# Patient Record
Sex: FEMALE | Race: WHITE | URBAN - METROPOLITAN AREA
[De-identification: names, ages, dates, MRNs, and addresses within clinical notes are randomized per-mention and may not be internally consistent; named-entity substitution may affect disease eponyms.]

---

## 2023-01-02 ENCOUNTER — OFFICE VISIT (OUTPATIENT)
Dept: FAMILY MEDICINE CLINIC | Facility: CLINIC | Age: 33
End: 2023-01-02
Payer: COMMERCIAL

## 2023-01-02 VITALS
HEIGHT: 67 IN | OXYGEN SATURATION: 99 % | HEART RATE: 118 BPM | SYSTOLIC BLOOD PRESSURE: 142 MMHG | DIASTOLIC BLOOD PRESSURE: 82 MMHG | TEMPERATURE: 97 F | WEIGHT: 130 LBS | RESPIRATION RATE: 16 BRPM | BODY MASS INDEX: 20.4 KG/M2

## 2023-01-02 DIAGNOSIS — Z71.1 PHYSICALLY WELL BUT WORRIED: Primary | ICD-10-CM

## 2023-01-02 PROCEDURE — 3077F SYST BP >= 140 MM HG: CPT

## 2023-01-02 PROCEDURE — 99202 OFFICE O/P NEW SF 15 MIN: CPT

## 2023-01-02 PROCEDURE — 3008F BODY MASS INDEX DOCD: CPT

## 2023-01-02 PROCEDURE — 3079F DIAST BP 80-89 MM HG: CPT

## 2023-01-02 RX ORDER — NORETHINDRONE ACETATE AND ETHINYL ESTRADIOL 1MG-20(21)
KIT ORAL
COMMUNITY
Start: 2022-12-13

## 2024-07-01 LAB
AMB EXT CHLAMYDIA, NUCLEIC ACID AMP: NOT DETECTED
AMB EXT GONOCOCCUS, NUCLEIC ACID AMP: NOT DETECTED

## 2024-07-17 LAB
AMB EXT TREPONEMAL ANTIBODIES: NONREACTIVE
ANTIBODY SCREEN OB: NEGATIVE
HEPATITIS B SURFACE ANTIGEN OB: NEGATIVE
HIV RESULT OB: NEGATIVE
RH FACTOR OB: POSITIVE

## 2024-11-13 LAB
AMB EXT TREPONEMAL ANTIBODIES: NONREACTIVE
HIV RESULT OB: NEGATIVE

## 2025-01-07 LAB — STREP GP B CULT OB: NEGATIVE

## 2025-02-07 ENCOUNTER — ANESTHESIA EVENT (OUTPATIENT)
Dept: OBGYN UNIT | Facility: HOSPITAL | Age: 35
End: 2025-02-07
Payer: COMMERCIAL

## 2025-02-07 ENCOUNTER — ANESTHESIA (OUTPATIENT)
Dept: OBGYN UNIT | Facility: HOSPITAL | Age: 35
End: 2025-02-07
Payer: COMMERCIAL

## 2025-02-07 ENCOUNTER — HOSPITAL ENCOUNTER (INPATIENT)
Facility: HOSPITAL | Age: 35
LOS: 2 days | Discharge: HOME | End: 2025-02-09
Attending: STUDENT IN AN ORGANIZED HEALTH CARE EDUCATION/TRAINING PROGRAM | Admitting: STUDENT IN AN ORGANIZED HEALTH CARE EDUCATION/TRAINING PROGRAM
Payer: COMMERCIAL

## 2025-02-07 PROBLEM — Z37.9 NORMAL LABOR (HCC): Status: ACTIVE | Noted: 2025-02-07

## 2025-02-07 LAB
ANTIBODY SCREEN: NEGATIVE
BASOPHILS # BLD AUTO: 0.03 X10(3) UL (ref 0–0.2)
BASOPHILS NFR BLD AUTO: 0.3 %
DEPRECATED RDW RBC AUTO: 41.2 FL (ref 35.1–46.3)
EOSINOPHIL # BLD AUTO: 0 X10(3) UL (ref 0–0.7)
EOSINOPHIL NFR BLD AUTO: 0 %
ERYTHROCYTE [DISTWIDTH] IN BLOOD BY AUTOMATED COUNT: 13.1 % (ref 11–15)
HCT VFR BLD AUTO: 41.1 %
HGB BLD-MCNC: 13.8 G/DL
IMM GRANULOCYTES # BLD AUTO: 0.09 X10(3) UL (ref 0–1)
IMM GRANULOCYTES NFR BLD: 0.8 %
LYMPHOCYTES # BLD AUTO: 0.28 X10(3) UL (ref 1–4)
LYMPHOCYTES NFR BLD AUTO: 2.5 %
MCH RBC QN AUTO: 29.2 PG (ref 26–34)
MCHC RBC AUTO-ENTMCNC: 33.6 G/DL (ref 31–37)
MCV RBC AUTO: 86.9 FL
MONOCYTES # BLD AUTO: 0.31 X10(3) UL (ref 0.1–1)
MONOCYTES NFR BLD AUTO: 2.8 %
NEUTROPHILS # BLD AUTO: 10.49 X10 (3) UL (ref 1.5–7.7)
NEUTROPHILS # BLD AUTO: 10.49 X10(3) UL (ref 1.5–7.7)
NEUTROPHILS NFR BLD AUTO: 93.6 %
PLATELET # BLD AUTO: 243 10(3)UL (ref 150–450)
RBC # BLD AUTO: 4.73 X10(6)UL
RH BLOOD TYPE: POSITIVE
RH BLOOD TYPE: POSITIVE
T PALLIDUM AB SER QL IA: NONREACTIVE
WBC # BLD AUTO: 11.2 X10(3) UL (ref 4–11)

## 2025-02-07 PROCEDURE — 0KQM0ZZ REPAIR PERINEUM MUSCLE, OPEN APPROACH: ICD-10-PCS | Performed by: OBSTETRICS & GYNECOLOGY

## 2025-02-07 PROCEDURE — 85025 COMPLETE CBC W/AUTO DIFF WBC: CPT | Performed by: STUDENT IN AN ORGANIZED HEALTH CARE EDUCATION/TRAINING PROGRAM

## 2025-02-07 PROCEDURE — 86850 RBC ANTIBODY SCREEN: CPT | Performed by: STUDENT IN AN ORGANIZED HEALTH CARE EDUCATION/TRAINING PROGRAM

## 2025-02-07 PROCEDURE — 86900 BLOOD TYPING SEROLOGIC ABO: CPT | Performed by: STUDENT IN AN ORGANIZED HEALTH CARE EDUCATION/TRAINING PROGRAM

## 2025-02-07 PROCEDURE — 99204 OFFICE O/P NEW MOD 45 MIN: CPT

## 2025-02-07 PROCEDURE — 86901 BLOOD TYPING SEROLOGIC RH(D): CPT | Performed by: STUDENT IN AN ORGANIZED HEALTH CARE EDUCATION/TRAINING PROGRAM

## 2025-02-07 PROCEDURE — 86780 TREPONEMA PALLIDUM: CPT | Performed by: STUDENT IN AN ORGANIZED HEALTH CARE EDUCATION/TRAINING PROGRAM

## 2025-02-07 RX ORDER — LIDOCAINE HYDROCHLORIDE AND EPINEPHRINE 15; 5 MG/ML; UG/ML
INJECTION, SOLUTION EPIDURAL
Status: COMPLETED | OUTPATIENT
Start: 2025-02-07 | End: 2025-02-07

## 2025-02-07 RX ORDER — CHOLECALCIFEROL (VITAMIN D3) 25 MCG
1 TABLET,CHEWABLE ORAL DAILY
Status: DISCONTINUED | OUTPATIENT
Start: 2025-02-07 | End: 2025-02-09

## 2025-02-07 RX ORDER — ACETAMINOPHEN 500 MG
1000 TABLET ORAL EVERY 6 HOURS PRN
Status: DISCONTINUED | OUTPATIENT
Start: 2025-02-07 | End: 2025-02-07 | Stop reason: HOSPADM

## 2025-02-07 RX ORDER — AMMONIA 15 % (W/V)
0.3 AMPUL (EA) INHALATION AS NEEDED
Status: DISCONTINUED | OUTPATIENT
Start: 2025-02-07 | End: 2025-02-09

## 2025-02-07 RX ORDER — IBUPROFEN 600 MG/1
600 TABLET, FILM COATED ORAL EVERY 6 HOURS
Status: DISCONTINUED | OUTPATIENT
Start: 2025-02-07 | End: 2025-02-09

## 2025-02-07 RX ORDER — ACETAMINOPHEN 500 MG
1000 TABLET ORAL EVERY 6 HOURS PRN
Status: DISCONTINUED | OUTPATIENT
Start: 2025-02-07 | End: 2025-02-09

## 2025-02-07 RX ORDER — CALCIUM CARBONATE 500 MG/1
1000 TABLET, CHEWABLE ORAL EVERY 4 HOURS PRN
Status: DISCONTINUED | OUTPATIENT
Start: 2025-02-07 | End: 2025-02-07 | Stop reason: HOSPADM

## 2025-02-07 RX ORDER — SODIUM CHLORIDE, SODIUM LACTATE, POTASSIUM CHLORIDE, CALCIUM CHLORIDE 600; 310; 30; 20 MG/100ML; MG/100ML; MG/100ML; MG/100ML
INJECTION, SOLUTION INTRAVENOUS CONTINUOUS
Status: DISCONTINUED | OUTPATIENT
Start: 2025-02-07 | End: 2025-02-07 | Stop reason: HOSPADM

## 2025-02-07 RX ORDER — IBUPROFEN 600 MG/1
600 TABLET, FILM COATED ORAL ONCE AS NEEDED
Status: COMPLETED | OUTPATIENT
Start: 2025-02-07 | End: 2025-02-07

## 2025-02-07 RX ORDER — ACETAMINOPHEN 500 MG
500 TABLET ORAL EVERY 6 HOURS PRN
Status: DISCONTINUED | OUTPATIENT
Start: 2025-02-07 | End: 2025-02-09

## 2025-02-07 RX ORDER — SIMETHICONE 80 MG
80 TABLET,CHEWABLE ORAL 3 TIMES DAILY PRN
Status: DISCONTINUED | OUTPATIENT
Start: 2025-02-07 | End: 2025-02-09

## 2025-02-07 RX ORDER — CHOLECALCIFEROL (VITAMIN D3) 25 MCG
1 TABLET,CHEWABLE ORAL DAILY
COMMUNITY

## 2025-02-07 RX ORDER — ACETAMINOPHEN 500 MG
500 TABLET ORAL EVERY 6 HOURS PRN
Status: DISCONTINUED | OUTPATIENT
Start: 2025-02-07 | End: 2025-02-07 | Stop reason: HOSPADM

## 2025-02-07 RX ORDER — TERBUTALINE SULFATE 1 MG/ML
0.25 INJECTION SUBCUTANEOUS AS NEEDED
Status: COMPLETED | OUTPATIENT
Start: 2025-02-07 | End: 2025-02-07

## 2025-02-07 RX ORDER — ONDANSETRON 2 MG/ML
4 INJECTION INTRAMUSCULAR; INTRAVENOUS EVERY 6 HOURS PRN
Status: DISCONTINUED | OUTPATIENT
Start: 2025-02-07 | End: 2025-02-07 | Stop reason: HOSPADM

## 2025-02-07 RX ORDER — LIDOCAINE HYDROCHLORIDE 10 MG/ML
INJECTION, SOLUTION INFILTRATION; PERINEURAL
Status: COMPLETED | OUTPATIENT
Start: 2025-02-07 | End: 2025-02-07

## 2025-02-07 RX ORDER — BISACODYL 10 MG
10 SUPPOSITORY, RECTAL RECTAL ONCE AS NEEDED
Status: DISCONTINUED | OUTPATIENT
Start: 2025-02-07 | End: 2025-02-09

## 2025-02-07 RX ORDER — NALBUPHINE HYDROCHLORIDE 10 MG/ML
2.5 INJECTION INTRAMUSCULAR; INTRAVENOUS; SUBCUTANEOUS
Status: DISCONTINUED | OUTPATIENT
Start: 2025-02-07 | End: 2025-02-07

## 2025-02-07 RX ORDER — DOCUSATE SODIUM 100 MG/1
100 CAPSULE, LIQUID FILLED ORAL
Status: DISCONTINUED | OUTPATIENT
Start: 2025-02-07 | End: 2025-02-09

## 2025-02-07 RX ORDER — BUPIVACAINE HYDROCHLORIDE 2.5 MG/ML
20 INJECTION, SOLUTION EPIDURAL; INFILTRATION; INTRACAUDAL ONCE
Status: DISCONTINUED | OUTPATIENT
Start: 2025-02-07 | End: 2025-02-07 | Stop reason: HOSPADM

## 2025-02-07 RX ORDER — CITRIC ACID/SODIUM CITRATE 334-500MG
30 SOLUTION, ORAL ORAL AS NEEDED
Status: DISCONTINUED | OUTPATIENT
Start: 2025-02-07 | End: 2025-02-07 | Stop reason: HOSPADM

## 2025-02-07 RX ORDER — DEXTROSE, SODIUM CHLORIDE, SODIUM LACTATE, POTASSIUM CHLORIDE, AND CALCIUM CHLORIDE 5; .6; .31; .03; .02 G/100ML; G/100ML; G/100ML; G/100ML; G/100ML
INJECTION, SOLUTION INTRAVENOUS AS NEEDED
Status: DISCONTINUED | OUTPATIENT
Start: 2025-02-07 | End: 2025-02-07 | Stop reason: HOSPADM

## 2025-02-07 RX ORDER — BUPIVACAINE HCL/0.9 % NACL/PF 0.25 %
5 PLASTIC BAG, INJECTION (ML) EPIDURAL AS NEEDED
Status: DISCONTINUED | OUTPATIENT
Start: 2025-02-07 | End: 2025-02-07

## 2025-02-07 RX ORDER — LIDOCAINE HYDROCHLORIDE 10 MG/ML
30 INJECTION, SOLUTION EPIDURAL; INFILTRATION; INTRACAUDAL; PERINEURAL ONCE
Status: COMPLETED | OUTPATIENT
Start: 2025-02-07 | End: 2025-02-07

## 2025-02-07 RX ADMIN — LIDOCAINE HYDROCHLORIDE AND EPINEPHRINE 5 ML: 15; 5 INJECTION, SOLUTION EPIDURAL at 03:55:00

## 2025-02-07 RX ADMIN — LIDOCAINE HYDROCHLORIDE 5 ML: 10 INJECTION, SOLUTION INFILTRATION; PERINEURAL at 03:55:00

## 2025-02-07 NOTE — ANESTHESIA POSTPROCEDURE EVALUATION
Patient: Monie Booth    Procedure Summary       Date: 02/07/25 Room / Location:     Anesthesia Start: 0355 Anesthesia Stop: 0859    Procedure: LABOR ANALGESIA Diagnosis:     Scheduled Providers:  Anesthesiologist: Trini Astorga MD    Anesthesia Type: epidural ASA Status: 2            Anesthesia Type: epidural    Vitals Value Taken Time   /74 02/07/25 0945   Temp 98.5 °F (36.9 °C) 02/07/25 0945   Pulse 116 02/07/25 0945   Resp 16 02/07/25 0945   SpO2 100 % 02/07/25 0915       [facility] AN Post Evaluation:   Patient Evaluated in PACU  Patient Participation: complete - patient participated  Level of Consciousness: awake  Pain Management: adequate  Airway Patency:patent  Dental exam unchanged from preop  Yes    Cardiovascular Status: acceptable  Respiratory Status: acceptable  Postoperative Hydration acceptable      TRINI ASTORGA MD  2/7/2025 10:17 AM

## 2025-02-07 NOTE — PROGRESS NOTES
[facility]  part of [facility]    Labor Progress Note    Monie Booth Patient Status:  Inpatient    1990 MRN [id number]   Location [facility] Attending Montserrat Donato DO   Hosp Day # 0 PCP None Pcp     Subjective:   Interval History:   Feeling pressure    Objective:   Vital signs in last 24 hours:  Temp:  [98.2 °F (36.8 °C)-98.5 °F (36.9 °C)] 98.5 °F (36.9 °C)  Pulse:  [] 125  Resp:  [16] 16  BP: ()/(44-89) 123/77  SpO2:  [96 %-100 %] 100 %    Input/Output:    Intake/Output Summary (Last 24 hours) at 2025 0831  Last data filed at 2025 0800  Gross per 24 hour   Intake --   Output 600 ml   Net -600 ml       Fetal Surveillance:  Fetal heart tones: accelerations  Uterine contractions: adequate    Sterile vaginal exam:  Dilation: 10 cm dilation   Effacement: 100%   Station: +1   Position: Cephalic  Presentation: vertex    Results:   Lab Results   Component Value Date    TREPONEMALAB Nonreactive 2025    RAPIDHIVSCRN Negative 2024    ABO B 2025    RH Positive 2025    WBC 11.2 (H) 2025    HGB 13.8 2025    HCT 41.1 2025    .0 2025       No results found for: \"DDIMER\", \"ESRML\", \"ESRPF\", \"CRP\", \"BNP\", \"MG\", \"PHOS\", \"TROP\", \"TROPHS\", \"CK\", \"CKMB\", \"TRAM\", \"RPR\", \"B12\", \"ETOH\", \"COLORUR\", \"CLARITY\", \"SPECGRAVITY\", \"PROUR\", \"GLUUR\", \"KETUR\", \"BILUR\", \"BLOODURINE\", \"NITRITE\", \"UROBILINOGEN\", \"LEUUR\", \"UASA\", \"POCGLU\", \"BLDCUL\", \"BLDSMR\"    Assessment & Plan:     Normal labor (HCC)  Complete.  Will start pushing.    Fetal tracing reassuring with good variability.     Plan discussed with patient who verbalizes understanding and agreement.    Ermelinda Elkins MD  2025

## 2025-02-07 NOTE — L&D DELIVERY NOTE
Aris Booth [[id number]]      Labor Events     labor?: No   steroids?: None  Antibiotics received during labor?: No  Rupture date/time: 2025 0800     Rupture type: SROM  Fluid color: Meconium  Labor type: Spontaneous Onset of Labor  Augmentation: None  Intrapartum & labor complications: Variable decelerations, Meconium       Labor Length    1st stage: 14h 12m  2nd stage: 0h 40m  3rd stage: 0h 06m       Labor Event Times    Labor onset date/time: 2025 1800  Dilation complete date/time: 2025  Start pushing date/time: 202515        Presentation    Presentation: Vertex  Position: Occiput Anterior       Operative Delivery    Operative Vaginal Delivery: No                Shoulder Dystocia    Shoulder Dystocia: No       Anesthesia    Method: Epidural               Delivery      Head delivery date/time: 2025 08:52:40   Delivery date/time:  25 08:52:51   Delivery type: Normal spontaneous vaginal delivery    Details:     Delivery location: delivery room  Delivery Room Temperature: 73       Delivery Providers    Delivering Clinician: Ermelinda Elkins MD   Delivery personnel:  Provider Role   Deisy Ochoa, RN Baby Nurse   Leticia Block, RN Delivery Nurse   Kristine Bhatia, RN Baby Nurse             Cord    Vessels: 3 Vessels  Complications: Nuchal, Body cord  # of loops: 2  Timed cord clamping: Yes  Time in sec: 300  Cord blood disposition: to lab  Gases sent?: No       Resuscitation    Method: None       [place] Measurements      Weight: 3180 g 7 lb 0.2 oz Length: 53.3 cm     Head circum.: 35.5 cm              Placenta    Date/time: 2025 0859  Removal: Spontaneous  Appearance: Intact  Disposition: Discarded       Apgars    Living status: Living   Apgar Scoring Key:    0 1 2    Skin color Blue or pale Acrocyanotic Completely pink    Heart rate Absent <100 bpm >100 bpm    Reflex irritability No response Grimace Cry or active withdrawal    Muscle  tone Limp Some flexion Active motion    Respiratory effort Absent Weak cry; hypoventilation Good, crying              1 Minute:  5 Minute:  10 Minute:  15 Minute:  20 Minute:      Skin color: 1  1       Heart rate: 2  2       Reflex irritablity: 2  2       Muscle tone: 2  2       Respiratory effort: 2  2       Total: 9  9          Apgars assigned by: MARYCRUZ CLAIRE RN   disposition: with mother       Skin to Skin    Skin to skin initiated date/time: 2025 0853  Skin to skin with: Mother       Vaginal Count    Initial count RN: Catherine Hanson RN  Initial count Tech: Candi Alvarado   Sharps    Initial counts 10   0    Final counts 10   2    Final count RN: Leticia Block RN  Final count MD: Ermelinda Elkins MD       Lacerations    Episiotomy: None  Perineal lacerations: 2nd Repaired?: Yes     Vaginal laceration?: No      Cervical laceration?: No    Clitoral laceration?: No    Quantitative blood loss (mL): 81            [facility]  part of [facility]    Vaginal Delivery Note    Monie Booth Patient Status:  Inpatient    1990 MRN [id number]   Location [facility] Attending Montserrat Donato DO   Hosp Day # 0 PCP None Pcp     Delivery     Infant  Date of Delivery: 2025   Time of Delivery: 8:52 AM  Delivery Type: Normal spontaneous vaginal delivery    Infant Sex/Birthweight: male 7 lb 0.2 oz (3.18 kg)    Presentation Vertex [1]  Position   Occiput [1] Anterior [1]    Apgars:  1 minute: 9               5 minutes: 9                        10 minutes:      Placenta  Date/Time of Delivery: 2025  8:59 AM   Delivery: spontaneous  Placenta to Pathology: no  Cord Gases Submitted: no  Cord Blood Collection: no  Cord Tissue Collection: no  Cord Complications: single nuchal and body  Sponge and Needle Counts:  Verified yes    Maternal Anesthesia: epidural   Episiotomy/Laceration Repair  Episiotomy: none  Laceration: perineal 2 degree  Location:  midline  Suture Size/Type: 2-0 Chromic and 3-0 Chromic  Anesthesia: 1% lidocaine    Delivery Complications  none    Neonatologist Present: no  Delivery Comment: Patient complete and pushing in LDR with epidural.  Delivered ZE head over intact perineum.  Nuchal cord x 1.  Body delivered and placed on maternal abdomen.  Cord clamped x2 and cut by father.   of intact 3v cord and placenta.  Laceration repaired in routine manner.  Cervix and rectum intact.        Intake/Output   Quantitative Blood Loss (mL) 81      Ermelinda Elkins MD   2025  11:03 AM

## 2025-02-07 NOTE — PROGRESS NOTES
Patient up to bathroom with assist x 2.  Unable to void at this time. Patient transferred to mother/baby room 364 per wheelchair in stable condition with baby and personal belongings.  Accompanied by significant other and staff.  Report given to Liane mother/baby RN.

## 2025-02-07 NOTE — PROGRESS NOTES
Pt is a 34 year old female admitted to TR1/TR1-A.     Chief Complaint   Patient presents with    R/o Labor     Patient states she started vomiting and having diarrhea at 6pm which then led her to start having contractions      Pt is  Unknown intra-uterine pregnancy.  History obtained, consents signed. Oriented to room, staff, and plan of care.

## 2025-02-07 NOTE — ANESTHESIA PREPROCEDURE EVALUATION
Anesthesia PreOp Note    HPI:     Monie Booth is a 34 year old female who presents for preoperative consultation requested by: * No surgeons listed *    Date of Surgery: 2/7/2025    * No procedures listed *  Indication: * No pre-op diagnosis entered *    Relevant Problems   No relevant active problems       NPO:                         History Review:  Patient Active Problem List    Diagnosis Date Noted    Normal labor (HCC) 02/07/2025       Past Medical History:    Anemia       History reviewed. No pertinent surgical history.    Prescriptions Prior to Admission[1]  Current Medications and Prescriptions Ordered in Epic[2]    Allergies[3]    No family history on file.  Social History     Socioeconomic History    Marital status:    Tobacco Use    Smoking status: Never    Smokeless tobacco: Never       Available pre-op labs reviewed.  Lab Results   Component Value Date    WBC 11.2 (H) 02/07/2025    RBC 4.73 02/07/2025    HGB 13.8 02/07/2025    HCT 41.1 02/07/2025    MCV 86.9 02/07/2025    MCH 29.2 02/07/2025    MCHC 33.6 02/07/2025    RDW 13.1 02/07/2025    .0 02/07/2025             Vital Signs:  Body mass index is 25.84 kg/m².   height is 1.702 m (5' 7\") and weight is 74.8 kg (165 lb). Her oral temperature is 98.4 °F (36.9 °C). Her blood pressure is 119/89 and her pulse is 108. Her respiration is 16.   Vitals:    02/07/25 0226   BP: 119/89   Pulse: 108   Resp: 16   Temp: 98.4 °F (36.9 °C)   TempSrc: Oral   Weight: 74.8 kg (165 lb)   Height: 1.702 m (5' 7\")        Anesthesia Evaluation     Patient summary reviewed and Nursing notes reviewed    Airway   Mallampati: II  TM distance: >3 FB  Neck ROM: full  Dental      Pulmonary - negative ROS and normal exam   Cardiovascular - negative ROS and normal exam  Exercise tolerance: good    NYHA Classification: I    Neuro/Psych - negative ROS     GI/Hepatic/Renal - negative ROS     Endo/Other - negative ROS   Abdominal  - normal exam     Other findings:  02/07/25 03:02  WBC: 11.2 (H)  Hemoglobin: 13.8  Hematocrit: 41.1  Platelet Count: 243.0      (H): Data is abnormally high            Anesthesia Plan:   ASA:  2  Plan:   Epidural  Informed Consent Plan and Risks Discussed With:  Patient  Discussed plan with:  Attending and surgeon      I have informed Monie Booth and/or legal guardian or family member of the nature of the anesthetic plan, benefits, risks including possible dental damage if relevant, major complications, and any alternative forms of anesthetic management.   All of the patient's questions were answered to the best of my ability. The patient desires the anesthetic management as planned.  I have informed this Monie Booth [relative] of the risks of neuraxial anesthesia including, but not limited to: failure,headache, backache, spinal, unilateral/patchy block, difficulty breathing, infection, bleeding, nerve damage, paralysis, death. The patient desires the proposed neuraxial anesthetic as planned.  MANJINDER SINGLETON MD  2/7/2025 3:53 AM  Present on Admission:  **None**           [1]   Medications Prior to Admission   Medication Sig Dispense Refill Last Dose/Taking    prenatal vitamin with DHA 27-0.8-228 MG Oral Cap Take 1 capsule by mouth daily.   2/6/2025    BLISOVI FE 1/20 1-20 MG-MCG Oral Tab    2/6/2025   [2]   Current Facility-Administered Medications Ordered in Epic   Medication Dose Route Frequency Provider Last Rate Last Admin    acetaminophen (Tylenol Extra Strength) tab 500 mg  500 mg Oral Q6H PRN Donato, Montserrat, DO        acetaminophen (Tylenol Extra Strength) tab 1,000 mg  1,000 mg Oral Q6H PRN Donato, Montserrat, DO        ibuprofen (Motrin) tab 600 mg  600 mg Oral Once PRN Donato, Montserrat, DO        ondansetron (Zofran) 4 MG/2ML injection 4 mg  4 mg Intravenous Q6H PRN Donato, Montserrat, DO   4 mg at 02/07/25 0316    oxyTOCIN in sodium chloride 0.9% (Pitocin) 30 Units/500mL infusion premix  62.5-900 shelia-units/min Intravenous Continuous Donato,  Montserrat, DO        sodium citrate-citric acid (Bicitra) 500-334 MG/5ML oral solution 30 mL  30 mL Oral PRN Montserrat Donato, DO        lidocaine PF (Xylocaine-MPF) 1% injection  30 mL Intradermal Once Montserrat Donato, DO        lactated ringers infusion   Intravenous Continuous Montserrat Donato, DO        dextrose in lactated ringers 5% infusion   Intravenous PRN Montserrat Donato, DO        lactated ringers IV bolus 500 mL  500 mL Intravenous PRN Montserrat Donato, DO        fentaNYL (Sublimaze) 50 mcg/mL injection 100 mcg  100 mcg Intravenous Once Montserrat Donato, DO        fentaNYL (Sublimaze) 50 mcg/mL injection 50 mcg  50 mcg Intravenous Q30 Min PRN Montserrat Donato DO        calcium carbonate (Tums) chewable tab 1,000 mg  1,000 mg Oral Q4H PRN Montserrat Donato, DO        fentaNYL-bupivacaine 2 mcg/mL-0.125% in sodium chloride 0.9% 100 mL EPIDURAL infusion premix   Epidural Continuous Kenia Madrigal MD        fentaNYL (Sublimaze) 50 mcg/mL injection 100 mcg  100 mcg Epidural Once Kenia Madrigal MD        bupivacaine PF (Marcaine) 0.25% injection  20 mL Epidural Once Kenia Madrigal MD        ePHEDrine (PF) 25 MG/5 ML injection 5 mg  5 mg Intravenous PRN Kenia Madrigal MD        nalbuphine (Nubain) 10 mg/mL injection 2.5 mg  2.5 mg Intravenous Q15 Min PRN Kenia Madrigal MD         No current Epic-ordered outpatient medications on file.   [3] No Known Allergies

## 2025-02-07 NOTE — PLAN OF CARE
Problem: POSTPARTUM  Goal: Long Term Goal:Experiences normal postpartum course  Description: INTERVENTIONS:  - Assess and monitor vital signs and lab values.  - Assess fundus and lochia.  - Provide ice/sitz baths for perineum discomfort.  - Monitor healing of incision/episiotomy/laceration, and assess for signs and symptoms of infection and hematoma.  - Assess bladder function and monitor for bladder distention.  - Provide/instruct/assist with pericare as needed.  - Provide VTE prophylaxis as needed.  - Monitor bowel function.  - Encourage ambulation and provide assistance as needed.  - Assess and monitor emotional status and provide social service/psych resources as needed.  - Utilize standard precautions and use personal protective equipment as indicated. Ensure aseptic care of all intravenous lines and invasive tubes/drains.  - Obtain immunization and exposure to communicable diseases history.  Outcome: Progressing  Goal: Optimize infant feeding at the breast  Description: INTERVENTIONS:  - Initiate breast feeding within first hour after birth.   - Monitor effectiveness of current breast feeding efforts.  - Assess support systems available to mother/family.  - Identify cultural beliefs/practices regarding lactation, letdown techniques, maternal food preferences.  - Assess mother's knowledge and previous experience with breast feeding.  - Provide information as needed about early infant feeding cues (e.g., rooting, lip smacking, sucking fingers/hand) versus late cue of crying.  - Discuss/demonstrate breast feeding aids (e.g., infant sling, nursing footstool/pillows, and breast pumps).  - Encourage mother/other family members to express feelings/concerns, and actively listen.  - Educate father/SO about benefits of breast feeding and how to manage common lactation challenges.  - Recommend avoidance of specific medications or substances incompatible with breast feeding.  - Assess and monitor for signs of nipple  pain/trauma.  - Instruct and provide assistance with proper latch.  - Review techniques for milk expression (breast pumping) and storage of breast milk. Provide pumping equipment/supplies, instructions and assistance, as needed.  - Encourage rooming-in and breast feeding on demand.  - Encourage skin-to-skin contact.  - Provide LC support as needed.  - Assess for and manage engorgement.  - Provide breast feeding education handouts and information on community breast feeding support.   Outcome: Progressing  Goal: Establishment of adequate milk supply with medication/procedure interruptions  Description: INTERVENTIONS:  - Review techniques for milk expression (breast pumping).   - Provide pumping equipment/supplies, instructions, and assistance until it is safe to breastfeed infant.  Outcome: Progressing  Goal: Experiences normal breast weaning course  Description: INTERVENTIONS:  - Assess for and manage engorgement.  - Instruct on breast care.  - Provide comfort measures.  Outcome: Progressing  Goal: Appropriate maternal -  bonding  Description: INTERVENTIONS:  - Assess caregiver- interactions.  - Assess caregiver's emotional status and coping mechanisms.  - Encourage caregiver to participate in  daily care.  - Assess support systems available to mother/family.  - Provide /case management support as needed.  Outcome: Progressing     Problem: GENITOURINARY - ADULT  Goal: Absence of urinary retention  Description: INTERVENTIONS:  - Assess patient’s ability to void and empty bladder  - Monitor intake/output and perform bladder scan as needed  - Follow urinary retention protocol/standard of care  - Consider collaborating with pharmacy to review patient's medication profile  - Implement strategies to promote bladder emptying  Outcome: Progressing     Problem: Patient/Family Goals  Goal: Patient/Family Long Term Goal  Description: Patient's Long Term Goal: Uncomplicated Delivery      Interventions:  - Assessment/Monitoring  - Induction/Augmentation per protocol and Provider order  - C/S per protocol and Provider order   - Education  - Intervention per protocol and Provider order with education   - Involve patient in POC  - See additional Care Plan goals for specific interventions     Outcome: Progressing  Goal: Patient/Family Short Term Goal  Description: Patient's Short Term Goal: Comfort and Pain Control     Interventions:   - Non Pharmacological pain intervention   - IV/IM and Epidural pain medication per Provider order and patient request  - Education  - Involve Patient in POC   - See additional Care Plan goals for specific interventions       Outcome: Progressing     Problem: Patient Centered Care  Goal: Patient preferences are identified and integrated in the patient's plan of care  Description: Interventions:  - What would you like us to know as we care for you? Patient is having a surprise baby, first time mom, and would like an epidural for pain management  - Provide timely, complete, and accurate information to patient/family  - Incorporate patient and family knowledge, values, beliefs, and cultural backgrounds into the planning and delivery of care  - Encourage patient/family to participate in care and decision-making at the level they choose  - Honor patient and family perspectives and choices  Outcome: Progressing

## 2025-02-07 NOTE — H&P
[facility]  part of [facility]    History & Physical    Monie Booth Patient Status:  Inpatient    1990 MRN [id number]   Location [facility] Attending Montserrat Donato DO   Hosp Day # 0 PCP None Pcp     Date of Admission:  2025      HPI:   Monie Booth is a 34 year old  female, current EGA of 40w3d with an estimated date of delivery of: 2025, by Patient Reported who presents due to contractions.\  C/o onset of vomiting and diarrhea earlier this afternoon. No fever. Started noting contractions later into the evening. Feel they were spaced at time and then back to back to back. Also noted some small bleeding as well. Advised triage at this point.     Being admitted for labor management.      Her current obstetrical history is uncomplicated.    Patient Active Problem List   Diagnosis    Normal labor (HCC)         History   Obstetric History:   OB History    Para Term  AB Living   1             SAB IAB Ectopic Multiple Live Births                  # Outcome Date GA Lbr Oswaldo/2nd Weight Sex Type Anes PTL Lv   1 Current                Past Medical History:   Past Medical History:    Anemia       Past Surgerical History: History reviewed. No pertinent surgical history.    Social History:   Social History     Tobacco Use    Smoking status: Never    Smokeless tobacco: Never   Substance Use Topics    Alcohol use: Not on file        Allergies/Medications:   Allergies:   Allergies[1]    Medications:  Prescriptions Prior to Admission[2]      Review of Systems:   As documented in HPI      Physical Exam:   Temp:  [98.4 °F (36.9 °C)] 98.4 °F (36.9 °C)  Pulse:  [108] 108  Resp:  [16] 16  BP: (119)/(89) 119/89    Neurologic: Alert and oriented  Psychiatric: Cooperative  Constitutional: alert and cooperative  Pulm: non labored breathing  CV: regular rate  Abdomen: soft,  nontender, gravid    SVE:  /-1 per RN in triage     FHT assessment:  145bpm/mod/+accels/portion of recurrent late decelerations when moved to [facility]       Results:     Recent Results (from the past 24 hours)   CBC With Differential With Platelet    Collection Time: 25  3:02 AM   Result Value Ref Range    WBC 11.2 (H) 4.0 - 11.0 x10(3) uL    RBC 4.73 3.80 - 5.30 x10(6)uL    HGB 13.8 12.0 - 16.0 g/dL    HCT 41.1 35.0 - 48.0 %    MCV 86.9 80.0 - 100.0 fL    MCH 29.2 26.0 - 34.0 pg    MCHC 33.6 31.0 - 37.0 g/dL    RDW-SD 41.2 35.1 - 46.3 fL    RDW 13.1 11.0 - 15.0 %    .0 150.0 - 450.0 10(3)uL    Neutrophil Absolute Prelim 10.49 (H) 1.50 - 7.70 x10 (3) uL    Neutrophil Absolute 10.49 (H) 1.50 - 7.70 x10(3) uL    Lymphocyte Absolute 0.28 (L) 1.00 - 4.00 x10(3) uL    Monocyte Absolute 0.31 0.10 - 1.00 x10(3) uL    Eosinophil Absolute 0.00 0.00 - 0.70 x10(3) uL    Basophil Absolute 0.03 0.00 - 0.20 x10(3) uL    Immature Granulocyte Absolute 0.09 0.00 - 1.00 x10(3) uL    Neutrophil % 93.6 %    Lymphocyte % 2.5 %    Monocyte % 2.8 %    Eosinophil % 0.0 %    Basophil % 0.3 %    Immature Granulocyte % 0.8 %       Prenatal Labs  Blood Type:  Rh positive  HIV- non reactive  Syphilis- non reactive  Hepatitis B: non reactive  Hepatitis C: non reactive  Rubella immune  GBS negative.       Imaging:  Reviewed      Assessment/Plan:     Monie Booth is a 34 year old  female, current EGA of 40w3d who presents for admission due to labor    Expectantly manage labor.     Called to bedside at approximately 0300 for recurrent late decels once transferred to [facility]. Patient repositioned several times and IV fluid bolus started. Gallo q1-2 min. Terbutaline given at approx 0315 and contractions spaced thereafter with improvement to category 1 FHRT.   Reasonable to proceed with Epidural      Montserrat Donato, DO  2025  3:37 AM         [1] No Known Allergies  [2]   Medications Prior to Admission   Medication Sig Dispense Refill Last Dose/Taking    prenatal vitamin with DHA 27-0.8-228 MG Oral  Cap Take 1 capsule by mouth daily.   2/6/2025    BLISOVI FE 1/20 1-20 MG-MCG Oral Tab    2/6/2025

## 2025-02-07 NOTE — ANESTHESIA PROCEDURE NOTES
Labor Analgesia    Date/Time: 2/7/2025 3:55 AM    Performed by: Kenia Madrigal MD  Authorized by: Kenia Madrigal MD      General Information and Staff    Start Time:  2/7/2025 3:55 AM  End Time:  2/7/2025 4:12 AM  Anesthesiologist:  Kenia Madrigal MD  Performed by:  Anesthesiologist  Patient Location:  OB  Site Identification: surface landmarks    Reason for Block: labor epidural    Preanesthetic Checklist: patient identified, IV checked, site marked, risks and benefits discussed, monitors and equipment checked, pre-op evaluation, timeout performed, IV bolus, anesthesia consent and sterile technique used      Procedure Details    Patient Position:  Sitting  Prep: ChloraPrep and patient draped    Monitoring:  Heart rate, cardiac monitor and continuous pulse ox  Approach:  Midline    Epidural Needle    Injection Technique:  NOMAN saline  Needle Type:  Tuohy  Needle Gauge:  18 G  Needle Length:  3.5 in  Needle Insertion Depth:  4  Location:  L2-3    Spinal Needle      Catheter    Catheter Type:  Multi-orifice  Catheter Size:  20 G  Catheter at Skin Depth:  11  Test Dose:  Negative    Assessment    Sensory Level:  T8    Additional Comments

## 2025-02-07 NOTE — PLAN OF CARE
Problem: BIRTH - VAGINAL/ SECTION  Goal: Fetal and maternal status remain reassuring during the birth process  Description: INTERVENTIONS:  - Monitor vital signs  - Monitor fetal heart rate  - Monitor uterine activity  - Monitor labor progression (vaginal delivery)  - DVT prophylaxis (C/S delivery)  - Surgical antibiotic prophylaxis (C/S delivery)  Outcome: Progressing     Problem: PAIN - ADULT  Goal: Verbalizes/displays adequate comfort level or patient's stated pain goal  Description: INTERVENTIONS:  - Encourage pt to monitor pain and request assistance  - Assess pain using appropriate pain scale  - Administer analgesics based on type and severity of pain and evaluate response  - Implement non-pharmacological measures as appropriate and evaluate response  - Consider cultural and social influences on pain and pain management  - Manage/alleviate anxiety  - Utilize distraction and/or relaxation techniques  - Monitor for opioid side effects  - Notify MD/LIP if interventions unsuccessful or patient reports new pain  - Anticipate increased pain with activity and pre-medicate as appropriate  Outcome: Progressing     Problem: ANXIETY  Goal: Will report anxiety at manageable levels  Description: INTERVENTIONS:  - Administer medication as ordered  - Teach and rehearse alternative coping skills  - Provide emotional support with 1:1 interaction with staff  Outcome: Progressing     Problem: Patient Centered Care  Goal: Patient preferences are identified and integrated in the patient's plan of care  Description: Interventions:  - What would you like us to know as we care for you? Patient is having a surprise baby, first time mom, and would like an epidural for pain management  - Provide timely, complete, and accurate information to patient/family  - Incorporate patient and family knowledge, values, beliefs, and cultural backgrounds into the planning and delivery of care  - Encourage patient/family to participate in  care and decision-making at the level they choose  - Honor patient and family perspectives and choices  Outcome: Progressing     Problem: Patient/Family Goals  Goal: Patient/Family Long Term Goal  Description: Patient's Long Term Goal: Uncomplicated Delivery     Interventions:  - Assessment/Monitoring  - Induction/Augmentation per protocol and Provider order  - C/S per protocol and Provider order   - Education  - Intervention per protocol and Provider order with education   - Involve patient in POC  - See additional Care Plan goals for specific interventions     Outcome: Progressing  Goal: Patient/Family Short Term Goal  Description: Patient's Short Term Goal: Comfort and Pain Control     Interventions:   - Non Pharmacological pain intervention   - IV/IM and Epidural pain medication per Provider order and patient request  - Education  - Involve Patient in POC   - See additional Care Plan goals for specific interventions       Outcome: Progressing

## 2025-02-08 LAB
BASOPHILS # BLD AUTO: 0.03 X10(3) UL (ref 0–0.2)
BASOPHILS NFR BLD AUTO: 0.2 %
DEPRECATED RDW RBC AUTO: 43.2 FL (ref 35.1–46.3)
EOSINOPHIL # BLD AUTO: 0.07 X10(3) UL (ref 0–0.7)
EOSINOPHIL NFR BLD AUTO: 0.6 %
ERYTHROCYTE [DISTWIDTH] IN BLOOD BY AUTOMATED COUNT: 13.3 % (ref 11–15)
HCT VFR BLD AUTO: 31.6 %
HGB BLD-MCNC: 10.9 G/DL
IMM GRANULOCYTES # BLD AUTO: 0.16 X10(3) UL (ref 0–1)
IMM GRANULOCYTES NFR BLD: 1.3 %
LYMPHOCYTES # BLD AUTO: 1.73 X10(3) UL (ref 1–4)
LYMPHOCYTES NFR BLD AUTO: 13.6 %
MCH RBC QN AUTO: 30.6 PG (ref 26–34)
MCHC RBC AUTO-ENTMCNC: 34.5 G/DL (ref 31–37)
MCV RBC AUTO: 88.8 FL
MONOCYTES # BLD AUTO: 1.08 X10(3) UL (ref 0.1–1)
MONOCYTES NFR BLD AUTO: 8.5 %
NEUTROPHILS # BLD AUTO: 9.61 X10 (3) UL (ref 1.5–7.7)
NEUTROPHILS # BLD AUTO: 9.61 X10(3) UL (ref 1.5–7.7)
NEUTROPHILS NFR BLD AUTO: 75.8 %
PLATELET # BLD AUTO: 200 10(3)UL (ref 150–450)
RBC # BLD AUTO: 3.56 X10(6)UL
WBC # BLD AUTO: 12.7 X10(3) UL (ref 4–11)

## 2025-02-08 PROCEDURE — 85025 COMPLETE CBC W/AUTO DIFF WBC: CPT | Performed by: OBSTETRICS & GYNECOLOGY

## 2025-02-08 NOTE — LACTATION NOTE
This note was copied from a baby's chart.  LACTATION NOTE - INFANT    Evaluation Type  Evaluation Type: Inpatient    Problems & Assessment  Problems Diagnosed or Identified: Sleepy  Infant Assessment: Hunger cues present  Muscle tone: Appropriate for GA    Feeding Assessment  Summary Current Feeding: Breastfeeding exclusively  Breastfeeding Assessment: Assisted with breastfeeding w/mother's permission;Pulling on nipple;Calm and ready to breastfeed;Deep latch achieved and observed  Breastfeeding Positions: laid back;left breast  Latch: Repeated attempts, hold nipple in mouth, stimulate to suck  Audible Sucks/Swallows: A few with stimulation  Type of Nipple: Everted (after stimulation)  Comfort (Breast/Nipple): Filling, red/small blisters/bruises, mild/mod discomfort  Hold (Positioning): No assist from staff, mother able to position/hold infant  LATCH Score: 7                        Baby had circ earlier today and been sleepy/ fussy. Assisted with a breast feeding session, good sucks and swallows. Would like to come in for a outpatient appt.

## 2025-02-08 NOTE — PLAN OF CARE
Problem: Patient Centered Care  Goal: Patient preferences are identified and integrated in the patient's plan of care  Description: Interventions:  - What would you like us to know as we care for you? Patient is having a surprise baby, first time mom, and would like an epidural for pain management  - Provide timely, complete, and accurate information to patient/family  - Incorporate patient and family knowledge, values, beliefs, and cultural backgrounds into the planning and delivery of care  - Encourage patient/family to participate in care and decision-making at the level they choose  - Honor patient and family perspectives and choices  Outcome: Progressing     Problem: GENITOURINARY - ADULT  Goal: Absence of urinary retention  Description: INTERVENTIONS:  - Assess patient’s ability to void and empty bladder  - Monitor intake/output and perform bladder scan as needed  - Follow urinary retention protocol/standard of care  - Consider collaborating with pharmacy to review patient's medication profile  - Implement strategies to promote bladder emptying  Outcome: Progressing     Problem: POSTPARTUM  Goal: Optimize infant feeding at the breast  Description: INTERVENTIONS:  - Initiate breast feeding within first hour after birth.   - Monitor effectiveness of current breast feeding efforts.  - Assess support systems available to mother/family.  - Identify cultural beliefs/practices regarding lactation, letdown techniques, maternal food preferences.  - Assess mother's knowledge and previous experience with breast feeding.  - Provide information as needed about early infant feeding cues (e.g., rooting, lip smacking, sucking fingers/hand) versus late cue of crying.  - Discuss/demonstrate breast feeding aids (e.g., infant sling, nursing footstool/pillows, and breast pumps).  - Encourage mother/other family members to express feelings/concerns, and actively listen.  - Educate father/SO about benefits of breast feeding and  how to manage common lactation challenges.  - Recommend avoidance of specific medications or substances incompatible with breast feeding.  - Assess and monitor for signs of nipple pain/trauma.  - Instruct and provide assistance with proper latch.  - Review techniques for milk expression (breast pumping) and storage of breast milk. Provide pumping equipment/supplies, instructions and assistance, as needed.  - Encourage rooming-in and breast feeding on demand.  - Encourage skin-to-skin contact.  - Provide LC support as needed.  - Assess for and manage engorgement.  - Provide breast feeding education handouts and information on community breast feeding support.   Outcome: Progressing  Goal: Establishment of adequate milk supply with medication/procedure interruptions  Description: INTERVENTIONS:  - Review techniques for milk expression (breast pumping).   - Provide pumping equipment/supplies, instructions, and assistance until it is safe to breastfeed infant.  Outcome: Progressing  Goal: Appropriate maternal -  bonding  Description: INTERVENTIONS:  - Assess caregiver- interactions.  - Assess caregiver's emotional status and coping mechanisms.  - Encourage caregiver to participate in  daily care.  - Assess support systems available to mother/family.  - Provide /case management support as needed.  Outcome: Progressing

## 2025-02-08 NOTE — PROGRESS NOTES
[facility]  part of [facility]    OB/Gyne Postpartum Progress Note      Monie Booth Patient Status:  Inpatient    1990 MRN [id number]   Location [facility] 3SE Attending Montserrat Donato DO   Hosp Day # 1 PCP None Pcp       Subjective      Patient feeling well.   Pain well controlled. Lochia appropriate.   Tolerating diet. Denies N/V  Ambulating. Spontaneously voiding. Passing flatus   Breastfeeding    Objective   Vital signs in last 24 hours:  Temp:  [97.9 °F (36.6 °C)-98.7 °F (37.1 °C)] 97.9 °F (36.6 °C)  Pulse:  [64-88] 64  Resp:  [15-18] 16  BP: (103-122)/(62-74) 103/65    Input/Output:    Intake/Output Summary (Last 24 hours) at 2025 1305  Last data filed at 2025 1605  Gross per 24 hour   Intake --   Output 800 ml   Net -800 ml         Constitutional: comfortable  Pulm: non labored breathing  CV: regular rate  Abdomen: soft, nontender, nondistended  Uterus: fundus firm at umbilicus,   Extremities: No calf tenderness      Results:   Labs / Path / Radiology:    Recent Results (from the past 24 hours)   CBC With Differential With Platelet    Collection Time: 25  5:36 AM   Result Value Ref Range    WBC 12.7 (H) 4.0 - 11.0 x10(3) uL    RBC 3.56 (L) 3.80 - 5.30 x10(6)uL    HGB 10.9 (L) 12.0 - 16.0 g/dL    HCT 31.6 (L) 35.0 - 48.0 %    MCV 88.8 80.0 - 100.0 fL    MCH 30.6 26.0 - 34.0 pg    MCHC 34.5 31.0 - 37.0 g/dL    RDW-SD 43.2 35.1 - 46.3 fL    RDW 13.3 11.0 - 15.0 %    .0 150.0 - 450.0 10(3)uL    Neutrophil Absolute Prelim 9.61 (H) 1.50 - 7.70 x10 (3) uL    Neutrophil Absolute 9.61 (H) 1.50 - 7.70 x10(3) uL    Lymphocyte Absolute 1.73 1.00 - 4.00 x10(3) uL    Monocyte Absolute 1.08 (H) 0.10 - 1.00 x10(3) uL    Eosinophil Absolute 0.07 0.00 - 0.70 x10(3) uL    Basophil Absolute 0.03 0.00 - 0.20 x10(3) uL    Immature Granulocyte Absolute 0.16 0.00 - 1.00 x10(3) uL    Neutrophil % 75.8 %    Lymphocyte % 13.6 %    Monocyte % 8.5 %    Eosinophil % 0.6 %     Basophil % 0.2 %    Immature Granulocyte % 1.3 %       Recent Labs   Lab 25  0302 25  0536   RBC 4.73 3.56*   HGB 13.8 10.9*   HCT 41.1 31.6*   MCV 86.9 88.8   MCH 29.2 30.6   MCHC 33.6 34.5   RDW 13.1 13.3   NEPRELIM 10.49* 9.61*   WBC 11.2* 12.7*   .0 200.0             Assessment/Plan   34 year oldyo  , s/p , postpartum day 1.      Postpartum:  -General diet, ambulating  -Voiding freely   -Meeting milestones  Continue postpartum care     2. Baby boy - discussed risks of circumcision including bleeding, infection, need for circumcision revision      Nanda Gay MD  2025  1:05 PM

## 2025-02-08 NOTE — LACTATION NOTE
LACTATION NOTE - MOTHER      Evaluation Type: Inpatient    Problems identified  Problems identified: Knowledge deficit         Breastfeeding goal  Breastfeeding goal: To maintain breast milk feeding per patient goal    Maternal Assessment  Bilateral Breasts: Soft;Symmetrical  Bilateral Nipples: WNL  Prior breastfeeding experience (comment below): Primip  Breastfeeding Assistance: Breastfeeding assistance provided with permission;Hand expression provided with permission    Pain assessment  Location/Comment: denies                   Assisted with a breast feeding session. Baby placed skin to skin, and able to do breast crawl and latch in laid back. Very sleepy and only  a few sucks, patient states baby is latching well.  Encouraged STS. Patient return demonstrated hand expression and spoon feeding. Discussed normal NB behavior. Encouraged to call LC if assistance with breastfeeding is needed.

## 2025-02-08 NOTE — LACTATION NOTE
This note was copied from a baby's chart.  LACTATION NOTE - INFANT    Evaluation Type  Evaluation Type: Inpatient    Problems & Assessment  Problems Diagnosed or Identified: Sleepy  Infant Assessment: Hunger cues present  Muscle tone: Appropriate for GA    Feeding Assessment  Summary Current Feeding: Breastfeeding exclusively  Breastfeeding Assessment: Assisted with breastfeeding w/mother's permission;Pulling on nipple;Calm and ready to breastfeed;Deep latch achieved and observed  Breastfeeding Positions: laid back;left breast  Latch: Repeated attempts, hold nipple in mouth, stimulate to suck  Audible Sucks/Swallows: Spontaneous and intermittent (24 hours old)  Type of Nipple: Everted (after stimulation)  Comfort (Breast/Nipple): Soft/non-tender  Hold (Positioning): Full assist, teach one side, mother does other, staff holds  LATCH Score: 8

## 2025-02-08 NOTE — DISCHARGE INSTRUCTIONS
For Outpatient Lactation Appointment- We are located in [facility] office next to [facility] on the first floor, try not to fed baby for 2 hours before appt, and bring your breast pump with you, any milk or formula you are supplementing with, and the bottles you are using.       Call your healthcare provider right away:   -fever over 100.4  -heavy vaginal bleeding   -severe or worsening pain unrelieved by medication   -severe anxiety or depression  - Dizziness and/or changes in vision  - severe headache,   - calf pain or swelling  - Chest pain or shortness of breath.     You are on pelvic rest for 6 weeks. This means nothing in vagina (no intercourse, tampons, etc.) and no baths or swimming pool.    No heavy lifting (no more than the baby) until cleared by OB.

## 2025-02-09 VITALS
TEMPERATURE: 98 F | OXYGEN SATURATION: 100 % | DIASTOLIC BLOOD PRESSURE: 63 MMHG | WEIGHT: 165 LBS | HEIGHT: 67 IN | SYSTOLIC BLOOD PRESSURE: 107 MMHG | HEART RATE: 73 BPM | RESPIRATION RATE: 16 BRPM | BODY MASS INDEX: 25.9 KG/M2

## 2025-02-09 PROBLEM — Z37.9 NORMAL LABOR (HCC): Status: RESOLVED | Noted: 2025-02-07 | Resolved: 2025-02-09

## 2025-02-09 RX ORDER — IBUPROFEN 200 MG
600 TABLET ORAL EVERY 6 HOURS PRN
Qty: 20 TABLET | Refills: 0 | Status: SHIPPED | COMMUNITY
Start: 2025-02-09

## 2025-02-09 RX ORDER — PSEUDOEPHEDRINE HCL 30 MG
100 TABLET ORAL 2 TIMES DAILY PRN
Qty: 20 CAPSULE | Refills: 0 | Status: SHIPPED | COMMUNITY
Start: 2025-02-09

## 2025-02-09 RX ORDER — ACETAMINOPHEN 500 MG
500 TABLET ORAL EVERY 6 HOURS PRN
Qty: 20 TABLET | Refills: 0 | Status: SHIPPED | COMMUNITY
Start: 2025-02-09

## 2025-02-09 NOTE — DISCHARGE SUMMARY
[facility]  part of [facility]    Obstetrical Discharge Summary    Monie Booth Patient Status:  Inpatient    1990 MRN [id number]   Location [facility] 3SE Attending Montserrat Donato DO   Hosp Day # 2 PCP None Pcp     Date of Admission: 2025     Date of Discharge: 2025    Admitting Diagnosis: PREGNANCY  Normal labor (HCC)    Discharge Diagnosis: .Active Problems:    * No active hospital problems. *      Disposition: home    Hospital Course:   Reason for Admission:  Monie Booth is a 34 year old  who was admitted with Estimated Date of Delivery: 25. She presented for labor.       Hospital Course: Pt underwent a spontaneous vaginal. Refer to delivery note/ operative report for details.  Pt was transferred to mother/ baby.  She underwent an uncomplicated postpartum course.  Pt is being discharged today.    Date of Delivery: 2025   Time of Delivery: 8:52 AM  Delivery Type: Normal spontaneous vaginal delivery    Live   Information for the patient's :  Aris Booth [[id number]]   male infant   Information for the patient's :  Aris Booth [[id number]]   7 lb 0.2 oz (3.18 kg)   Apgars:  1 minute: 9               5 minutes: 9                        10 minutes:        Postpartum Course: Her postpartum course was uncomplicated     Results:   Recent Results (from the past 2 weeks)   CBC With Differential With Platelet    Collection Time: 25  3:02 AM   Result Value Ref Range    WBC 11.2 (H) 4.0 - 11.0 x10(3) uL    RBC 4.73 3.80 - 5.30 x10(6)uL    HGB 13.8 12.0 - 16.0 g/dL    HCT 41.1 35.0 - 48.0 %    MCV 86.9 80.0 - 100.0 fL    MCH 29.2 26.0 - 34.0 pg    MCHC 33.6 31.0 - 37.0 g/dL    RDW-SD 41.2 35.1 - 46.3 fL    RDW 13.1 11.0 - 15.0 %    .0 150.0 - 450.0 10(3)uL    Neutrophil Absolute Prelim 10.49 (H) 1.50 - 7.70 x10 (3) uL    Neutrophil Absolute 10.49 (H) 1.50 - 7.70 x10(3) uL    Lymphocyte Absolute 0.28 (L) 1.00 - 4.00  x10(3) uL    Monocyte Absolute 0.31 0.10 - 1.00 x10(3) uL    Eosinophil Absolute 0.00 0.00 - 0.70 x10(3) uL    Basophil Absolute 0.03 0.00 - 0.20 x10(3) uL    Immature Granulocyte Absolute 0.09 0.00 - 1.00 x10(3) uL    Neutrophil % 93.6 %    Lymphocyte % 2.5 %    Monocyte % 2.8 %    Eosinophil % 0.0 %    Basophil % 0.3 %    Immature Granulocyte % 0.8 %   T Pallidum Screening Cascade    Collection Time: 02/07/25  3:02 AM   Result Value Ref Range    Treponemal Antibodies Nonreactive Nonreactive    ABORH (Blood Type)    Collection Time: 02/07/25  3:02 AM   Result Value Ref Range    ABO BLOOD TYPE B     RH BLOOD TYPE Positive    Antibody Screen    Collection Time: 02/07/25  3:02 AM   Result Value Ref Range    Antibody Screen Negative    ABORH Confirmation    Collection Time: 02/07/25  4:24 AM   Result Value Ref Range    ABO BLOOD TYPE B     RH BLOOD TYPE Positive    CBC With Differential With Platelet    Collection Time: 02/08/25  5:36 AM   Result Value Ref Range    WBC 12.7 (H) 4.0 - 11.0 x10(3) uL    RBC 3.56 (L) 3.80 - 5.30 x10(6)uL    HGB 10.9 (L) 12.0 - 16.0 g/dL    HCT 31.6 (L) 35.0 - 48.0 %    MCV 88.8 80.0 - 100.0 fL    MCH 30.6 26.0 - 34.0 pg    MCHC 34.5 31.0 - 37.0 g/dL    RDW-SD 43.2 35.1 - 46.3 fL    RDW 13.3 11.0 - 15.0 %    .0 150.0 - 450.0 10(3)uL    Neutrophil Absolute Prelim 9.61 (H) 1.50 - 7.70 x10 (3) uL    Neutrophil Absolute 9.61 (H) 1.50 - 7.70 x10(3) uL    Lymphocyte Absolute 1.73 1.00 - 4.00 x10(3) uL    Monocyte Absolute 1.08 (H) 0.10 - 1.00 x10(3) uL    Eosinophil Absolute 0.07 0.00 - 0.70 x10(3) uL    Basophil Absolute 0.03 0.00 - 0.20 x10(3) uL    Immature Granulocyte Absolute 0.16 0.00 - 1.00 x10(3) uL    Neutrophil % 75.8 %    Lymphocyte % 13.6 %    Monocyte % 8.5 %    Eosinophil % 0.6 %    Basophil % 0.2 %    Immature Granulocyte % 1.3 %     No results for input(s): \"ABORH\" in the last 72 hours.  Recent Labs     02/07/25  0302 02/08/25  0536   WBC 11.2* 12.7*   HGB 13.8 10.9*   HCT  41.1 31.6*     No results found.    Complications: none       Discharge Plan:   Discharge Condition: stable     Discharge Meds:   Current Discharge Medication List        New Orders    Details   acetaminophen 500 MG Oral Tab Take 1 tablet (500 mg total) by mouth every 6 (six) hours as needed.      docusate sodium 100 MG Oral Cap Take 100 mg by mouth 2 (two) times daily as needed for constipation.      ibuprofen 200 MG Oral Tab Take 3 tablets (600 mg total) by mouth every 6 (six) hours as needed for Pain.           Home Meds - Unchanged    Details   prenatal vitamin with DHA 27-0.8-228 MG Oral Cap Take 1 capsule by mouth daily.                   Discharge Activity: Pelvic rest until cleared  No heavy lifting >15 lbs  Abstain from sexual intercourse until cleared at 6 wks PP  No driving the first week and when on opiates  Activity as tolerated.    No exercise until cleared at 6 wks PP    Follow up:     Follow up in office: 6 weeks         Other Discharge Instructions:           For Outpatient Lactation Appointment- We are located in [facility] office next to [facility] on the first floor, try not to fed baby for 2 hours before appt, and bring your breast pump with you, any milk or formula you are supplementing with, and the bottles you are using.       Call your healthcare provider right away:   -fever over 100.4  -heavy vaginal bleeding   -severe or worsening pain unrelieved by medication   -severe anxiety or depression  - Dizziness and/or changes in vision  - severe headache,   - calf pain or swelling  - Chest pain or shortness of breath.     You are on pelvic rest for 6 weeks. This means nothing in vagina (no intercourse, tampons, etc.) and no baths or swimming pool.    No heavy lifting (no more than the baby) until cleared by OB.           Nanda Gay MD  2/9/2025

## 2025-02-09 NOTE — PROGRESS NOTES
[facility]  part of [facility]    OB/Gyne Postpartum Progress Note      Monie Booth Patient Status:  Inpatient    1990 MRN [id number]   Location [facility] 3SE Attending Montserrat Donato DO   Hosp Day # 2 PCP None Pcp       Subjective      Patient feeling well.   Pain well controlled. Lochia appropriate.   Tolerating diet. Denies N/V  Ambulating. Spontaneously voiding. Passing flatus   Breastfeeding    Objective   Vital signs in last 24 hours:  Temp:  [97 °F (36.1 °C)-98.1 °F (36.7 °C)] 98.1 °F (36.7 °C)  Pulse:  [73] 73  Resp:  [16-18] 16  BP: (107)/(63) 107/63    Input/Output:  No intake or output data in the 24 hours ending 25 1146      Constitutional: comfortable  Pulm: non labored breathing  CV: regular rate  Abdomen: soft, nontender, nondistended  Uterus: fundus firm at umbilicus,   Extremities: No calf tenderness      Results:   Labs / Path / Radiology:    No results found for this or any previous visit (from the past 24 hours).    Recent Labs   Lab 25  0302 25  0536   RBC 4.73 3.56*   HGB 13.8 10.9*   HCT 41.1 31.6*   MCV 86.9 88.8   MCH 29.2 30.6   MCHC 33.6 34.5   RDW 13.1 13.3   NEPRELIM 10.49* 9.61*   WBC 11.2* 12.7*   .0 200.0             Assessment/Plan   34 year oldyo  , s/p , postpartum day 2.      Postpartum:  -General diet, ambulating  -Voiding freely   -Meeting milestones    Discharge home today  Discussed restrictions, instructions, medications  F/U in office in 6 weeks       Nanda Gay MD  2025  11:46 AM

## 2025-02-09 NOTE — PLAN OF CARE
Problem: Patient Centered Care  Goal: Patient preferences are identified and integrated in the patient's plan of care  Description: Interventions:  - What would you like us to know as we care for you? Patient is having a surprise baby, first time mom, and would like an epidural for pain management  - Provide timely, complete, and accurate information to patient/family  - Incorporate patient and family knowledge, values, beliefs, and cultural backgrounds into the planning and delivery of care  - Encourage patient/family to participate in care and decision-making at the level they choose  - Honor patient and family perspectives and choices  Outcome: Adequate for Discharge     Problem: Patient/Family Goals  Goal: Patient/Family Long Term Goal  Description: Patient's Long Term Goal: Uncomplicated Delivery     Interventions:  - Assessment/Monitoring  - Induction/Augmentation per protocol and Provider order  - C/S per protocol and Provider order   - Education  - Intervention per protocol and Provider order with education   - Involve patient in POC  - See additional Care Plan goals for specific interventions     Outcome: Adequate for Discharge  Goal: Patient/Family Short Term Goal  Description: Patient's Short Term Goal: Comfort and Pain Control     Interventions:   - Non Pharmacological pain intervention   - IV/IM and Epidural pain medication per Provider order and patient request  - Education  - Involve Patient in POC   - See additional Care Plan goals for specific interventions       Outcome: Adequate for Discharge     Problem: GENITOURINARY - ADULT  Goal: Absence of urinary retention  Description: INTERVENTIONS:  - Assess patient’s ability to void and empty bladder  - Monitor intake/output and perform bladder scan as needed  - Follow urinary retention protocol/standard of care  - Consider collaborating with pharmacy to review patient's medication profile  - Implement strategies to promote bladder emptying  Outcome:  Adequate for Discharge     Problem: POSTPARTUM  Goal: Long Term Goal:Experiences normal postpartum course  Description: INTERVENTIONS:  - Assess and monitor vital signs and lab values.  - Assess fundus and lochia.  - Provide ice/sitz baths for perineum discomfort.  - Monitor healing of incision/episiotomy/laceration, and assess for signs and symptoms of infection and hematoma.  - Assess bladder function and monitor for bladder distention.  - Provide/instruct/assist with pericare as needed.  - Provide VTE prophylaxis as needed.  - Monitor bowel function.  - Encourage ambulation and provide assistance as needed.  - Assess and monitor emotional status and provide social service/psych resources as needed.  - Utilize standard precautions and use personal protective equipment as indicated. Ensure aseptic care of all intravenous lines and invasive tubes/drains.  - Obtain immunization and exposure to communicable diseases history.  Outcome: Adequate for Discharge  Goal: Optimize infant feeding at the breast  Description: INTERVENTIONS:  - Initiate breast feeding within first hour after birth.   - Monitor effectiveness of current breast feeding efforts.  - Assess support systems available to mother/family.  - Identify cultural beliefs/practices regarding lactation, letdown techniques, maternal food preferences.  - Assess mother's knowledge and previous experience with breast feeding.  - Provide information as needed about early infant feeding cues (e.g., rooting, lip smacking, sucking fingers/hand) versus late cue of crying.  - Discuss/demonstrate breast feeding aids (e.g., infant sling, nursing footstool/pillows, and breast pumps).  - Encourage mother/other family members to express feelings/concerns, and actively listen.  - Educate father/SO about benefits of breast feeding and how to manage common lactation challenges.  - Recommend avoidance of specific medications or substances incompatible with breast feeding.  - Assess  and monitor for signs of nipple pain/trauma.  - Instruct and provide assistance with proper latch.  - Review techniques for milk expression (breast pumping) and storage of breast milk. Provide pumping equipment/supplies, instructions and assistance, as needed.  - Encourage rooming-in and breast feeding on demand.  - Encourage skin-to-skin contact.  - Provide LC support as needed.  - Assess for and manage engorgement.  - Provide breast feeding education handouts and information on community breast feeding support.   Outcome: Adequate for Discharge  Goal: Establishment of adequate milk supply with medication/procedure interruptions  Description: INTERVENTIONS:  - Review techniques for milk expression (breast pumping).   - Provide pumping equipment/supplies, instructions, and assistance until it is safe to breastfeed infant.  Outcome: Adequate for Discharge  Goal: Experiences normal breast weaning course  Description: INTERVENTIONS:  - Assess for and manage engorgement.  - Instruct on breast care.  - Provide comfort measures.  Outcome: Adequate for Discharge  Goal: Appropriate maternal -  bonding  Description: INTERVENTIONS:  - Assess caregiver- interactions.  - Assess caregiver's emotional status and coping mechanisms.  - Encourage caregiver to participate in  daily care.  - Assess support systems available to mother/family.  - Provide /case management support as needed.  Outcome: Adequate for Discharge   Discharge education and paperwork complete.

## 2025-02-13 ENCOUNTER — NURSE ONLY (OUTPATIENT)
Dept: LACTATION | Facility: HOSPITAL | Age: 35
End: 2025-02-13
Attending: OBSTETRICS & GYNECOLOGY
Payer: COMMERCIAL

## 2025-02-13 PROCEDURE — 99213 OFFICE O/P EST LOW 20 MIN: CPT

## 2025-02-25 ENCOUNTER — NURSE ONLY (OUTPATIENT)
Dept: LACTATION | Facility: HOSPITAL | Age: 35
End: 2025-02-25
Attending: OBSTETRICS & GYNECOLOGY
Payer: COMMERCIAL

## 2025-02-25 PROCEDURE — 99212 OFFICE O/P EST SF 10 MIN: CPT

## 2025-03-08 ENCOUNTER — TELEPHONE (OUTPATIENT)
Dept: OBGYN UNIT | Facility: HOSPITAL | Age: 35
End: 2025-03-08

## 2025-03-12 ENCOUNTER — NURSE ONLY (OUTPATIENT)
Dept: LACTATION | Facility: HOSPITAL | Age: 35
End: 2025-03-12
Attending: OBSTETRICS & GYNECOLOGY
Payer: COMMERCIAL

## 2025-03-12 PROCEDURE — 99213 OFFICE O/P EST LOW 20 MIN: CPT

## 2025-04-23 ENCOUNTER — NURSE ONLY (OUTPATIENT)
Dept: LACTATION | Facility: HOSPITAL | Age: 35
End: 2025-04-23
Attending: OBSTETRICS & GYNECOLOGY
Payer: COMMERCIAL

## 2025-04-23 PROCEDURE — 99212 OFFICE O/P EST SF 10 MIN: CPT

## 2025-06-13 ENCOUNTER — NURSE ONLY (OUTPATIENT)
Dept: LACTATION | Facility: HOSPITAL | Age: 35
End: 2025-06-13
Attending: OBSTETRICS & GYNECOLOGY
Payer: COMMERCIAL

## 2025-06-13 PROCEDURE — 99212 OFFICE O/P EST SF 10 MIN: CPT

## (undated) NOTE — LETTER
[facility]  Administration of Anesthesia  I Monie Booth agree to be cared for by a physician anesthesiologist alone and/or with a nurse anesthetist, who is specially trained to monitor me and give me medicine to put me to sleep or keep me comfortable during my procedure    I understand that my anesthesiologist and/or anesthetist is not an employee or agent of [facility] or [employer]. He or she works for [facility]    As the patient asking for anesthesia services, I agree to:  Allow the anesthesiologist (anesthesia doctor) to give me medicine and do additional procedures as necessary. Some examples are: Starting or using an “IV” to give me medicine, fluids or blood during my procedure, and having a breathing tube placed to help me breathe when I’m asleep (intubation). In the event that my heart stops working properly, I understand that my anesthesiologist will make every effort to sustain my life, unless otherwise directed by [facility] Do Not Resuscitate documents.  Tell my anesthesia doctor before my procedure:  If I am pregnant.  The last time that I ate or drank.  iii. All of the medicines I take (including prescriptions, herbal supplements, and pills I can buy without a prescription (including street drugs/illegal medications). Failure to inform my anesthesiologist about these medicines may increase my risk of anesthetic complications.  iv.If I am allergic to anything or have had a reaction to anesthesia before.  I understand how the anesthesia medicine will help me (benefits).  I understand that with any type of anesthesia medicine there are risks:  The most common risks are: nausea, vomiting, sore throat, muscle soreness, damage to my eyes, mouth, or teeth (from breathing tube placement).  Rare risks include: remembering what happened during my procedure, allergic reactions to medications, injury to my airway, heart, lungs, vision, nerves, or  muscles and in extremely rare instances death.  My doctor has explained to me other choices available to me for my care (alternatives).  Pregnant Patients (“epidural”):  I understand that the risks of having an epidural (medicine given into my back to help control pain during labor), include itching, low blood pressure, difficulty urinating, headache or slowing of the baby’s heart. Very rare risks include infection, bleeding, seizure, irregular heart rhythms and nerve injury.  Regional Anesthesia (“spinal”, “epidural”, & “nerve blocks”):  I understand that rare but potential complications include headache, bleeding, infection, seizure, irregular heart rhythms, and nerve injury.    _____________________________________________________________________________  Patient (or Representative) Signature/Relationship to Patient  Date   Time    _____________________________________________________________________________   Name (if used)    Language/Organization   Time    _____________________________________________________________________________  Nurse Anesthetist Signature     Date   Time  _____________________________________________________________________________  Anesthesiologist Signature     Date   Time  I have discussed the procedure and information above with the patient (or patient’s representative) and answered their questions. The patient or their representative has agreed to have anesthesia services.    _____________________________________________________________________________  Witness        Date   Time  I have verified that the signature is that of the patient or patient’s representative, and that it was signed before the procedure  Patient Name: Monie Booth     : 1990                 Printed: 2025 at 2:34 AM    Medical Record #: [id number]                                            Page 1 of 1  ----------ANESTHESIA CONSENT----------

## (undated) NOTE — LETTER
2/8/2025            Regarding: Graham Booth         Patient: Monie Booth        [address] [id number]         To whom it may concern,     Graham will be taking care of wife, Monie, due to postpartum care from 2/11/25 to 2/14/2025.    Sincerely,            Document electronically generated by:  Nanda Gay MD